# Patient Record
Sex: MALE | Race: WHITE | NOT HISPANIC OR LATINO
[De-identification: names, ages, dates, MRNs, and addresses within clinical notes are randomized per-mention and may not be internally consistent; named-entity substitution may affect disease eponyms.]

---

## 2024-03-04 PROBLEM — Z00.00 ENCOUNTER FOR PREVENTIVE HEALTH EXAMINATION: Status: ACTIVE | Noted: 2024-03-04

## 2024-03-05 ENCOUNTER — APPOINTMENT (OUTPATIENT)
Dept: CARDIOTHORACIC SURGERY | Facility: CLINIC | Age: 59
End: 2024-03-05
Payer: COMMERCIAL

## 2024-03-05 ENCOUNTER — NON-APPOINTMENT (OUTPATIENT)
Age: 59
End: 2024-03-05

## 2024-03-05 VITALS
TEMPERATURE: 97.6 F | BODY MASS INDEX: 32.93 KG/M2 | DIASTOLIC BLOOD PRESSURE: 88 MMHG | OXYGEN SATURATION: 95 % | HEART RATE: 98 BPM | WEIGHT: 230 LBS | RESPIRATION RATE: 18 BRPM | SYSTOLIC BLOOD PRESSURE: 143 MMHG | HEIGHT: 70 IN

## 2024-03-05 DIAGNOSIS — T82.897A OTHER SPECIFIED COMPLICATION OF CARDIAC PROSTHETIC DEVICES, IMPLANTS AND GRAFTS, INITIAL ENCOUNTER: ICD-10-CM

## 2024-03-05 DIAGNOSIS — I09.9 RHEUMATIC HEART DISEASE, UNSPECIFIED: ICD-10-CM

## 2024-03-05 DIAGNOSIS — Z87.39 PERSONAL HISTORY OF OTHER DISEASES OF THE MUSCULOSKELETAL SYSTEM AND CONNECTIVE TISSUE: ICD-10-CM

## 2024-03-05 DIAGNOSIS — I07.1 RHEUMATIC TRICUSPID INSUFFICIENCY: ICD-10-CM

## 2024-03-05 DIAGNOSIS — Z80.9 FAMILY HISTORY OF MALIGNANT NEOPLASM, UNSPECIFIED: ICD-10-CM

## 2024-03-05 DIAGNOSIS — Z86.79 PERSONAL HISTORY OF OTHER DISEASES OF THE CIRCULATORY SYSTEM: ICD-10-CM

## 2024-03-05 DIAGNOSIS — Z87.891 PERSONAL HISTORY OF NICOTINE DEPENDENCE: ICD-10-CM

## 2024-03-05 DIAGNOSIS — Z82.49 FAMILY HISTORY OF ISCHEMIC HEART DISEASE AND OTHER DISEASES OF THE CIRCULATORY SYSTEM: ICD-10-CM

## 2024-03-05 DIAGNOSIS — Z78.9 OTHER SPECIFIED HEALTH STATUS: ICD-10-CM

## 2024-03-05 DIAGNOSIS — I71.20 THORACIC AORTIC ANEURYSM, WITHOUT RUPTURE, UNSPECIFIED: ICD-10-CM

## 2024-03-05 PROCEDURE — 99203 OFFICE O/P NEW LOW 30 MIN: CPT

## 2024-03-05 PROCEDURE — 99204 OFFICE O/P NEW MOD 45 MIN: CPT

## 2024-03-05 RX ORDER — AMLODIPINE BESYLATE 5 MG/1
5 TABLET ORAL DAILY
Qty: 30 | Refills: 5 | Status: ACTIVE | COMMUNITY

## 2024-03-05 RX ORDER — ASCORBIC ACID 1000 MG
10 TABLET ORAL DAILY
Refills: 0 | Status: ACTIVE | COMMUNITY

## 2024-03-05 RX ORDER — ATORVASTATIN CALCIUM 20 MG/1
20 TABLET, FILM COATED ORAL
Qty: 1 | Refills: 0 | Status: ACTIVE | COMMUNITY

## 2024-03-05 RX ORDER — ASPIRIN 81 MG
81 TABLET, DELAYED RELEASE (ENTERIC COATED) ORAL DAILY
Qty: 30 | Refills: 1 | Status: ACTIVE | COMMUNITY

## 2024-03-06 NOTE — PHYSICAL EXAM
[Right Carotid Bruit] : no bruit heard over the right carotid [FreeTextEntry1] : Prior sternotomy incision well healed, sternum stable [Left Carotid Bruit] : no bruit heard over the left carotid [Left Femoral Bruit] : no bruit heard over the left femoral artery [Right Femoral Bruit] : no bruit heard over the right femoral artery

## 2024-03-06 NOTE — REVIEW OF SYSTEMS
[Chest Discomfort] : no chest discomfort [Leg Claudication] : no intermittent leg claudication [Lower Ext Edema] : no extremity edema [Palpitations] : no palpitations [Orthopnea] : no orthopnea [PND] : no PND [Syncope] : no syncope [Cough] : no cough [Wheezing] : no wheezing [Coughing Up Blood] : no hemoptysis

## 2024-03-06 NOTE — RESULTS/DATA
[TextEntry] : TTE 2/1/24 at [facility]:  - Severe LA dilatation (LA volume index 90 ml/m2) - LV has normal end-diastolic diameter - no LVH  - RV size is normal with normal wall motion. - the aortic root is normal in size (3.9cm) - there is a bioprosthesis in the aortic valve position. There is moderate AS (NICK 1.18). There is no paravalvular aortic regurgitation. 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 Aortic valve gradient is late peaking.  - compared to prior TTE from 6/23/21, there is now e/o bioprosthetic aortic valve degeneration.

## 2024-03-06 NOTE — PHYSICAL EXAM
[Well Developed] : well developed [Well Nourished] : well nourished [Normal Conjunctiva] : normal conjunctiva [No Acute Distress] : no acute distress [Normal S1, S2] : normal S1, S2 [No Gallop] : no gallop [No Rub] : no rub [Clear Lung Fields] : clear lung fields [Good Air Entry] : good air entry [No Respiratory Distress] : no respiratory distress  [Soft] : abdomen soft [Non Tender] : non-tender [Normal Bowel Sounds] : normal bowel sounds [Normal Gait] : normal gait [No Edema] : no edema [No Cyanosis] : no cyanosis [No Rash] : no rash [No Clubbing] : no clubbing [No Skin Lesions] : no skin lesions [No Focal Deficits] : no focal deficits [Moves all extremities] : moves all extremities [Normal Speech] : normal speech [Normal memory] : normal memory [Alert and Oriented] : alert and oriented [de-identified] : supple  [de-identified] : II/VI SM

## 2024-03-06 NOTE — REASON FOR VISIT
[Structural Heart and Valve Disease] : structural heart and valve disease [FreeTextEntry1] : Accompanied by daughterFelipe

## 2024-03-06 NOTE — DATA REVIEWED
Thank you for allowing us to care for you at [facility] today. Thank you for your patience.     You have been seen and evaluated.   We discussed the results of your workup  Please read the instructions provided  If given prescriptions, take as instructed    Today you were seen for symptoms that could be related to either COVID 19 or influenza both of which are contagious viral infections. Antibiotics will not help these infections. If you were positive for flu, you may have been given a prescription for Tamiflu.  Please take this as prescribed, and be aware that it may cause GI upset and other symptoms.  Please treat fevers with Tylenol or Motrin and remember to keep yourself hydrated as this is the most important thing to help you feel better.        Please return to the emergency department if you develop any new or worsening symptoms such as dehydration, persistant fevers, difficultly with breathing,  or if you develop any other new or concerning symptoms as these could be signs of more serious medical illness.    We hope you feel better.    
[FreeTextEntry1] : TTE 2/1/24 at [facility]:  - Severe LA dilatation (LA volume index 90 ml/m2) - LV has normal end-diastolic diameter - no LVH  - RV size is normal with normal wall motion. - the aortic root is normal in size (3.9cm) - there is a bioprosthesis in the aortic valve position. There is moderate AS (NICK 1.18). There is no paravalvular aortic regurgitation. 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 Aortic valve gradient is late peaking.  - compared to prior TTE from 6/23/21, there is now e/o bioprosthetic aortic valve degeneration.   1/5/24 CTA Chest @ [facility]:  - stable 4.2cm mid ascending aortic aneurysm, borderline aortic root. No thoracic aortic dissection or intramural hematoma. Cardiomegaly with left atrial dilatation.

## 2024-03-06 NOTE — ASSESSMENT
[FreeTextEntry1] : 58 [place] male, former smoker, with PHM of HTN, Atach s/p catheter ablation in 2017, rheumatic heart disease s/p MVR (#29 Medtronic Porcine valve), AVR (#25 Medtronic Porcine valve) and TV Repair (12/14/2011) presents with moderate to severe TR, moderate bioprosthetic AS, and severe bioprosthetic AR. Dr. Sepulveda reviewed the echocardiogram images with the patient and his family and discussed the case with Dr. Navarro. Dr. Sepulveda recommends that the patient have [facility] CTA for consideration for transcatheter Aortic Valve in Valve procedure by Dr. Navarro. All questions addressed.

## 2024-03-06 NOTE — ASSESSMENT
[FreeTextEntry1] : 58 [place] male, former smoker, with PHM of HTN, ATach s/p catheter ablation in 2017, rheumatic heart disease s/p MVR (#29 Medtronic Porcine valve), AVR (#25 Medtronic Porcine valve) and TV Repair (12/14/2011 at [facility] with Dr. Keith), moderate to severe TR, moderate bioprosthetic AS, and severe bioprosthetic AR referred for consultation of treatment options for mixed valve disease. Patient is clinically stable, NYHA II. Dr. Navarro have independently seen and evaluated the patient in this face-to-face encounter. Dr. Navarro have reviewed patient's history and pertinent clinical data. Echocardiogram from 2/1/24 was reviewed that showed moderate bioprosthetic aortic stenosis with severe bioprosthetic aortic regurgitation, and moderate to severe TR. Results were discussed with patient. Dr. Navarro recommend treatment of his bioprosthetic aortic valve disease. Treatment options including surgical AVR, TAVR and medical therapy were discussed. Dr. Sepulveda deemed patient high surgical risk given prior sternotomy. Plan reviewed with multidisciplinary heart valve team with consensus recommendation to treat his bioprosthetic aortic valve disease with Tana TAVR. The TAVR procedure including the risks (including but not limited to bleeding, infection, stroke, need for permanent pacemaker, heart attack, and death), and benefits were discussed at length with patient and his daughter. Patient has an upcoming surgical evaluation at [facility], they will reach out to our [facility] clinic if and when he decided to proceed with Tana TAVR so testing can be scheduled.  All questions were answered.

## 2024-03-06 NOTE — PLAN
[TextEntry] : - continue current medication regimen.  - follow up with Dr. Cooper for ongoing cardiology care. - patient will reach out to our D once they've made a decision on how to proceed with patient's aortic valve disease treatment.  - return to D as needed.

## 2024-03-06 NOTE — REVIEW OF SYSTEMS
[Dyspnea on exertion] : dyspnea during exertion [Snoring] : snoring [Joint Pain] : joint pain [Negative] : Heme/Lymph [Chest Discomfort] : no chest discomfort [Lower Ext Edema] : no extremity edema [Leg Claudication] : no intermittent leg claudication [Palpitations] : no palpitations [PND] : no PND [Orthopnea] : no orthopnea [Cough] : no cough [Syncope] : no syncope [Wheezing] : no wheezing [Coughing Up Blood] : no hemoptysis

## 2024-03-06 NOTE — END OF VISIT
[FreeTextEntry3] :   I, Dr. PELAYO [facility], personally performed the evaluation and management (E/M) services for this new patient.  That E/M includes conducting the clinically appropriate initial history &/or exam, assessing all conditions, and establishing the plan of care.  Today, my RONAK, was here to observe &/or participate in the visit & follow plan of care established by me.

## 2024-03-12 ENCOUNTER — TRANSCRIPTION ENCOUNTER (OUTPATIENT)
Age: 59
End: 2024-03-12